# Patient Record
Sex: FEMALE | Race: WHITE | NOT HISPANIC OR LATINO | URBAN - METROPOLITAN AREA
[De-identification: names, ages, dates, MRNs, and addresses within clinical notes are randomized per-mention and may not be internally consistent; named-entity substitution may affect disease eponyms.]

---

## 2017-01-25 ENCOUNTER — FOLLOW UP ESTABLISHED (OUTPATIENT)
Dept: URBAN - METROPOLITAN AREA CLINIC 44 | Facility: CLINIC | Age: 67
End: 2017-01-25
Payer: MEDICARE

## 2017-01-25 DIAGNOSIS — H25.13 AGE-RELATED NUCLEAR CATARACT, BILATERAL: ICD-10-CM

## 2017-01-25 DIAGNOSIS — H35.3121 NONEXUDATIVE MACULAR DEGENERATION, EARLY DRY STAGE, LEFT EYE: ICD-10-CM

## 2017-01-25 DIAGNOSIS — H53.022 REFRACTIVE AMBLYOPIA, LEFT EYE: ICD-10-CM

## 2017-01-25 PROCEDURE — 92014 COMPRE OPH EXAM EST PT 1/>: CPT | Performed by: OPTOMETRIST

## 2017-01-25 ASSESSMENT — INTRAOCULAR PRESSURE
OD: 20
OS: 19

## 2017-01-25 ASSESSMENT — KERATOMETRY
OD: 44.88
OS: 45.00

## 2017-01-25 ASSESSMENT — VISUAL ACUITY
OS: 20/25
OD: 20/20

## 2017-11-21 ENCOUNTER — FOLLOW UP ESTABLISHED (OUTPATIENT)
Dept: URBAN - METROPOLITAN AREA CLINIC 44 | Facility: CLINIC | Age: 67
End: 2017-11-21
Payer: MEDICARE

## 2017-11-21 PROCEDURE — 92250 FUNDUS PHOTOGRAPHY W/I&R: CPT | Performed by: OPHTHALMOLOGY

## 2017-11-21 PROCEDURE — 92083 EXTENDED VISUAL FIELD XM: CPT | Performed by: OPHTHALMOLOGY

## 2017-11-21 PROCEDURE — 92020 GONIOSCOPY: CPT | Performed by: OPHTHALMOLOGY

## 2017-11-21 PROCEDURE — 92014 COMPRE OPH EXAM EST PT 1/>: CPT | Performed by: OPHTHALMOLOGY

## 2017-11-21 ASSESSMENT — INTRAOCULAR PRESSURE
OD: 17
OS: 18

## 2018-11-26 ENCOUNTER — FOLLOW UP ESTABLISHED (OUTPATIENT)
Dept: URBAN - METROPOLITAN AREA CLINIC 44 | Facility: CLINIC | Age: 68
End: 2018-11-26
Payer: MEDICARE

## 2018-11-26 PROCEDURE — 92012 INTRM OPH EXAM EST PATIENT: CPT | Performed by: OPHTHALMOLOGY

## 2018-11-26 PROCEDURE — 92083 EXTENDED VISUAL FIELD XM: CPT | Performed by: OPHTHALMOLOGY

## 2018-11-26 PROCEDURE — 92250 FUNDUS PHOTOGRAPHY W/I&R: CPT | Performed by: OPHTHALMOLOGY

## 2018-11-26 ASSESSMENT — INTRAOCULAR PRESSURE
OD: 26
OS: 26

## 2021-04-29 ENCOUNTER — OFFICE VISIT (OUTPATIENT)
Dept: URBAN - METROPOLITAN AREA CLINIC 44 | Facility: CLINIC | Age: 71
End: 2021-04-29
Payer: MEDICARE

## 2021-04-29 DIAGNOSIS — H40.013 OPEN ANGLE WITH BORDERLINE FINDINGS, LOW RISK, BILATERAL: Primary | ICD-10-CM

## 2021-04-29 PROCEDURE — 92133 CPTRZD OPH DX IMG PST SGM ON: CPT | Performed by: OPTOMETRIST

## 2021-04-29 PROCEDURE — 99204 OFFICE O/P NEW MOD 45 MIN: CPT | Performed by: OPTOMETRIST

## 2021-04-29 PROCEDURE — 92134 CPTRZ OPH DX IMG PST SGM RTA: CPT | Performed by: OPTOMETRIST

## 2021-04-29 RX ORDER — LATANOPROST 50 UG/ML
0.005 % SOLUTION OPHTHALMIC
Qty: 7.5 | Refills: 3 | Status: ACTIVE
Start: 2021-04-29

## 2021-04-29 ASSESSMENT — INTRAOCULAR PRESSURE
OD: 22
OS: 26

## 2021-04-29 ASSESSMENT — KERATOMETRY
OD: 44.88
OS: 44.88

## 2021-04-29 ASSESSMENT — VISUAL ACUITY
OS: 20/100
OD: 20/20

## 2021-04-29 NOTE — IMPRESSION/PLAN
Impression: Refractive amblyopia, left eye Plan: Anisometropia OS myopic. Discussed increased risk and si/sx of RD. Monitor.

## 2021-04-29 NOTE — IMPRESSION/PLAN
Impression: Open angle with borderline findings, low risk, bilateral: H40.013. Plan: Small CDR OU Pachymetry: 650/650 IOP: 22/26 Current meds: latanoprost QHS OU Hx of COPD -- avoid beta blockers OCT RNFL (04/29/21): OD 76 (wnl overall, sup/inf thinning) OS 81 (wnl overall, inf thinning) IOP suboptimal.  Discussed adding drops vs rechecking in 3 months. Patient prefers to recheck in 3 months. RTC 3 months for IOP + 24-2 HVF. If IOP elevated or VF worse, may need to add medications.

## 2021-04-29 NOTE — IMPRESSION/PLAN
Impression: Nonexudative macular degeneration, early dry stage, left eye - current smoker, smoking cessation discussion Plan: Mild-moderate RPE changes OS, no SRF OU Current smoker: 1/2 ppd x 50 years Continue AREDS 2 Advise smoking cessation RTC if notice changes in vision

## 2021-07-27 ENCOUNTER — OFFICE VISIT (OUTPATIENT)
Dept: URBAN - METROPOLITAN AREA CLINIC 44 | Facility: CLINIC | Age: 71
End: 2021-07-27
Payer: MEDICARE

## 2021-07-27 PROCEDURE — 92083 EXTENDED VISUAL FIELD XM: CPT | Performed by: OPTOMETRIST

## 2021-07-27 PROCEDURE — 99214 OFFICE O/P EST MOD 30 MIN: CPT | Performed by: OPTOMETRIST

## 2021-07-27 RX ORDER — BRIMONIDINE TARTRATE 2 MG/ML
0.2 % SOLUTION/ DROPS OPHTHALMIC
Qty: 10 | Refills: 5 | Status: ACTIVE
Start: 2021-07-27

## 2021-07-27 ASSESSMENT — INTRAOCULAR PRESSURE
OD: 27
OS: 25
OD: 32
OS: 31

## 2021-07-27 NOTE — IMPRESSION/PLAN
Impression: Primary open-angle glaucoma, moderate stage, bilateral: H40.1132. Plan: Small CDR OU Pachymetry: 650/650 IOP: 27/25 Current meds: latanoprost QHS OU Hx of COPD -- avoid beta blockers OCT RNFL (04/29/21): OD 76 (wnl overall, sup/inf thinning) OS 81 (wnl overall, inf thinning) HVF (07/27/21): OD moderate paracentral defects (reliable) OS severe paracentral defects (reliable) IOP suboptimal.  Patient mentioned difficulty during the VF, so VF may look worse than it actually is. Continue latanoprost QHS OU. Start brimonidine BID OU. RTC 2-3 months for IOP check.

## 2021-09-29 ENCOUNTER — OFFICE VISIT (OUTPATIENT)
Dept: URBAN - METROPOLITAN AREA CLINIC 44 | Facility: CLINIC | Age: 71
End: 2021-09-29
Payer: MEDICARE

## 2021-09-29 PROCEDURE — 99213 OFFICE O/P EST LOW 20 MIN: CPT | Performed by: OPTOMETRIST

## 2021-09-29 ASSESSMENT — INTRAOCULAR PRESSURE
OD: 18
OS: 18

## 2021-09-29 NOTE — IMPRESSION/PLAN
Impression: Primary open-angle glaucoma, moderate stage, bilateral: H40.1132. Plan: Small CDR OU Pachymetry: 650/650 IOP: 18/18 Current meds: latanoprost QHS OU, brimonidine BID OU (started 07/27/21) Hx of COPD -- avoid beta blockers OCT RNFL (04/29/21): OD 76 (wnl overall, sup/inf thinning) OS 81 (wnl overall, inf thinning) HVF (07/27/21): OD moderate paracentral defects (reliable) OS severe paracentral defects (reliable) IOP improved after starting brimonidine. Continue latanoprost QHS OU, brimonidine BID OU.   RTC 6 months for complete exam + possible OCT RNFL

## 2021-11-01 ENCOUNTER — OFFICE VISIT (OUTPATIENT)
Dept: URBAN - METROPOLITAN AREA CLINIC 44 | Facility: CLINIC | Age: 71
End: 2021-11-01
Payer: MEDICARE

## 2021-11-01 PROCEDURE — 99214 OFFICE O/P EST MOD 30 MIN: CPT | Performed by: OPTOMETRIST

## 2021-11-01 RX ORDER — DORZOLAMIDE HCL 20 MG/ML
2 % SOLUTION/ DROPS OPHTHALMIC
Qty: 5 | Refills: 5 | Status: INACTIVE
Start: 2021-11-01 | End: 2022-01-05

## 2021-11-01 ASSESSMENT — INTRAOCULAR PRESSURE
OD: 22
OS: 22

## 2021-11-01 NOTE — IMPRESSION/PLAN
Impression: Primary open-angle glaucoma, moderate stage, bilateral: H40.1132. Plan: Small CDR OU Pachymetry: 650/650 IOP: 22/22 Current meds: latanoprost QHS OU Drop hx: irritation/blurry vision to brimonidine Hx of COPD -- avoid beta blockers OCT RNFL (04/29/21): OD 76 (wnl overall, sup/inf thinning) OS 81 (wnl overall, inf thinning) HVF (07/27/21): OD moderate paracentral defects (reliable) OS severe paracentral defects (reliable) Vision became sightly blurry after starting brimonidine. She discontinued almost a week ago. Use AT's q2h for the next week and then start dorzolamide BID OU (ERx sent). Continue latanoprost QHS OU. RTC 2 months for IOP check.

## 2022-01-05 ENCOUNTER — OFFICE VISIT (OUTPATIENT)
Dept: URBAN - METROPOLITAN AREA CLINIC 44 | Facility: CLINIC | Age: 72
End: 2022-01-05
Payer: MEDICARE

## 2022-01-05 DIAGNOSIS — H40.1132 PRIMARY OPEN-ANGLE GLAUCOMA, MODERATE STAGE, BILATERAL: Primary | ICD-10-CM

## 2022-01-05 PROCEDURE — 99213 OFFICE O/P EST LOW 20 MIN: CPT | Performed by: OPTOMETRIST

## 2022-01-05 RX ORDER — DORZOLAMIDE HCL 20 MG/ML
2 % SOLUTION/ DROPS OPHTHALMIC
Qty: 15 | Refills: 3 | Status: ACTIVE
Start: 2022-01-05

## 2022-01-05 ASSESSMENT — INTRAOCULAR PRESSURE
OD: 17
OS: 16

## 2022-01-05 NOTE — IMPRESSION/PLAN
Impression: Primary open-angle glaucoma, moderate stage, bilateral: H40.1132. Plan: Small CDR OU Pachymetry: 650/650 IOP: 17/16 Current meds: latanoprost QHS OU, dorzolamide BID OU Drop hx: irritation/blurry vision to brimonidine Hx of COPD -- avoid beta blockers OCT RNFL (04/29/21): OD 76 (wnl overall, sup/inf thinning) OS 81 (wnl overall, inf thinning) HVF (07/27/21): OD moderate paracentral defects (reliable) OS severe paracentral defects (reliable) IOP improved and patient tolerating meds well. Continue current meds.   RTC 4 months for complete exam + 24-2 HVF

## 2022-05-11 ENCOUNTER — OFFICE VISIT (OUTPATIENT)
Dept: URBAN - METROPOLITAN AREA CLINIC 44 | Facility: CLINIC | Age: 72
End: 2022-05-11
Payer: MEDICARE

## 2022-05-11 DIAGNOSIS — H25.813 COMBINED FORMS OF AGE-RELATED CATARACT, BILATERAL: ICD-10-CM

## 2022-05-11 DIAGNOSIS — H43.813 VITREOUS DEGENERATION, BILATERAL: ICD-10-CM

## 2022-05-11 DIAGNOSIS — H53.022 REFRACTIVE AMBLYOPIA, LEFT EYE: ICD-10-CM

## 2022-05-11 DIAGNOSIS — H04.123 TEAR FILM INSUFFICIENCY OF BILATERAL LACRIMAL GLANDS: Primary | ICD-10-CM

## 2022-05-11 DIAGNOSIS — H40.1132 PRIMARY OPEN-ANGLE GLAUCOMA, BILATERAL, MODERATE STAGE: ICD-10-CM

## 2022-05-11 DIAGNOSIS — H35.3121 NONEXUDATIVE AGE-RELATED MACULAR DEGENERATION, LEFT EYE, EARLY DRY STAGE: ICD-10-CM

## 2022-05-11 PROCEDURE — 92083 EXTENDED VISUAL FIELD XM: CPT | Performed by: OPTOMETRIST

## 2022-05-11 PROCEDURE — 99214 OFFICE O/P EST MOD 30 MIN: CPT | Performed by: OPTOMETRIST

## 2022-05-11 PROCEDURE — 92134 CPTRZ OPH DX IMG PST SGM RTA: CPT | Performed by: OPTOMETRIST

## 2022-05-11 ASSESSMENT — KERATOMETRY
OS: 45.00
OD: 45.00

## 2022-05-11 ASSESSMENT — VISUAL ACUITY
OS: 20/150
OD: 20/40

## 2022-05-11 ASSESSMENT — INTRAOCULAR PRESSURE
OD: 18
OS: 18

## 2022-05-11 NOTE — IMPRESSION/PLAN
Impression: Refractive amblyopia, left eye Plan: Anisometropia OS myopic. Discussed increased risk and si/sx of RD. Will limit VA after cataract surgery

## 2022-05-11 NOTE — IMPRESSION/PLAN
Impression: Nonexudative macular degeneration, early dry stage, left eye - current smoker, smoking cessation discussion Plan: Mild-moderate RPE changes OS, no SRF OU-- stable Current smoker: 1/2 ppd x 50 years Continue AREDS 2 Advise smoking cessation RTC if notice changes in vision

## 2022-05-11 NOTE — IMPRESSION/PLAN
Impression: Combined forms of age-related cataract, bilateral: H25.813. Plan: Discussed cataracts with patient. Discussed treatment options. Surgical treatment is recommended. Surgical risks and benefits discussed. Patient elects surgical treatment. Recommend surgery OU, OS first. standard lens, LenSx, ORA. Aim OD: plano. Aim OS: plano. Discussed near aim, but patient always wears glasses when reading. She does not mind wearing glasses after surgery. Consider MIGS. Glaucoma surgeon recommended. **Schedule cataract surgery when cleared by [facility]**.  Outcome of surgery limitations include:  Tear film insufficiency of bilateral lacrimal glands, Nonexudative age-related macular degeneration, left eye, early dry stage, Refractive amblyopia, left eye, Primary open-angle glaucoma, bilateral, moderate stage, and Vitreous degeneration, bilateral.

## 2022-05-11 NOTE — IMPRESSION/PLAN
Impression: Primary open-angle glaucoma, moderate stage, bilateral: H40.1132. Plan: Small CDR OU Pachymetry: 650/650 IOP: 18/18 Current meds: latanoprost QHS OU, dorzolamide BID OU Drop hx: irritation/blurry vision to brimonidine Hx of COPD -- avoid beta blockers OCT RNFL (04/29/21): OD 76 (wnl overall, sup/inf thinning) OS 81 (wnl overall, inf thinning) HVF (05/11/22): OD inf arcuate defect, shallow superior defect  (fair) OS dense paracentral defect  (poor) IOP okay. HVF stable OD, unreliable OS. Continue current meds. Discussed MIGS. Follow after cataract surgery.

## 2022-06-03 ENCOUNTER — OFFICE VISIT (OUTPATIENT)
Dept: URBAN - METROPOLITAN AREA CLINIC 44 | Facility: CLINIC | Age: 72
End: 2022-06-03
Payer: MEDICARE

## 2022-06-03 DIAGNOSIS — H25.813 COMBINED FORMS OF AGE-RELATED CATARACT, BILATERAL: ICD-10-CM

## 2022-06-03 DIAGNOSIS — H35.3122 NONEXUDATIVE MACULAR DEGENERATION, INTERMEDIATE DRY STAGE, LEFT EYE: Primary | ICD-10-CM

## 2022-06-03 DIAGNOSIS — H44.22 DEGENERATIVE MYOPIA, LEFT EYE: ICD-10-CM

## 2022-06-03 PROCEDURE — 92004 COMPRE OPH EXAM NEW PT 1/>: CPT | Performed by: OPHTHALMOLOGY

## 2022-06-03 PROCEDURE — 92134 CPTRZ OPH DX IMG PST SGM RTA: CPT | Performed by: OPHTHALMOLOGY

## 2022-06-03 ASSESSMENT — INTRAOCULAR PRESSURE
OD: 17
OS: 18

## 2022-06-03 NOTE — IMPRESSION/PLAN
Impression: Degenerative myopia, left eye: H44.22.  Plan: RD and secondary CNVM precautions emphasized

## 2022-06-03 NOTE — IMPRESSION/PLAN
Impression: Nonexudative macular degeneration, intermediate dry stage, left eye: H35.1215. Plan: No evidence of CNVM on imaging or examination. Will observe for now. Return precautions emphasized. If any distortions or changes in vision, to call immediately. [place]ler grid given Smoking cessation (not a smoker), AREDS2 vitamins, sunglasses on bright days.  Follow-up in 6 months, sooner PRN

## 2022-06-03 NOTE — IMPRESSION/PLAN
Impression: Combined forms of age-related cataract, bilateral: H25.813.  Plan: May proceed with CE/IOL OU - RD precautions emphasized OS

## 2022-07-11 ENCOUNTER — OFFICE VISIT (OUTPATIENT)
Dept: URBAN - METROPOLITAN AREA CLINIC 44 | Facility: CLINIC | Age: 72
End: 2022-07-11
Payer: MEDICARE

## 2022-07-11 DIAGNOSIS — Z01.818 ENCOUNTER FOR OTHER PREPROCEDURAL EXAMINATION: Primary | ICD-10-CM

## 2022-07-11 DIAGNOSIS — H25.13 AGE-RELATED NUCLEAR CATARACT, BILATERAL: Primary | ICD-10-CM

## 2022-07-11 PROCEDURE — 92025 CPTRIZED CORNEAL TOPOGRAPHY: CPT | Performed by: OPHTHALMOLOGY

## 2022-07-11 PROCEDURE — 99203 OFFICE O/P NEW LOW 30 MIN: CPT | Performed by: REGISTERED NURSE

## 2022-07-11 ASSESSMENT — PACHYMETRY
OS: 29.06
OS: 3.66
OD: 3.39
OD: 24.95

## 2022-07-23 ENCOUNTER — POST-OPERATIVE VISIT (OUTPATIENT)
Dept: URBAN - METROPOLITAN AREA CLINIC 44 | Facility: CLINIC | Age: 72
End: 2022-07-23
Payer: MEDICARE

## 2022-07-23 DIAGNOSIS — Z48.810 ENCOUNTER FOR SURGICAL AFTERCARE FOLLOWING SURGERY ON A SENSE ORGAN: Primary | ICD-10-CM

## 2022-07-23 PROCEDURE — 99024 POSTOP FOLLOW-UP VISIT: CPT | Performed by: OPTOMETRIST

## 2022-07-23 ASSESSMENT — INTRAOCULAR PRESSURE: OS: 17

## 2022-07-23 NOTE — IMPRESSION/PLAN
Impression: S/P Cataract Extraction by phacoemulsification with IOL placement; Astigmatic Keratotomy; Goniotomy OS - 1 Day. Encounter for surgical aftercare following surgery on a sense organ  Z48.810. Plan: Healing well. Use drops as prescribed.

## 2022-07-28 ENCOUNTER — POST-OPERATIVE VISIT (OUTPATIENT)
Dept: URBAN - METROPOLITAN AREA CLINIC 44 | Facility: CLINIC | Age: 72
End: 2022-07-28
Payer: MEDICARE

## 2022-07-28 DIAGNOSIS — Z48.810 ENCOUNTER FOR SURGICAL AFTERCARE FOLLOWING SURGERY ON A SENSE ORGAN: Primary | ICD-10-CM

## 2022-07-28 PROCEDURE — 99024 POSTOP FOLLOW-UP VISIT: CPT | Performed by: OPTOMETRIST

## 2022-07-28 ASSESSMENT — VISUAL ACUITY
OS: 20/25
OD: 20/25

## 2022-07-28 ASSESSMENT — INTRAOCULAR PRESSURE
OD: 15
OS: 14

## 2022-07-28 NOTE — IMPRESSION/PLAN
Impression: S/P Cataract Extraction by phacoemulsification with IOL placement; Astigmatic Keratotomy; Goniotomy OS - 6 Days. Encounter for surgical aftercare following surgery on a sense organ  Z48.810. Plan: Healing well with good vision. Proceed with 2nd Surgery.

## 2022-08-05 ENCOUNTER — POST-OPERATIVE VISIT (OUTPATIENT)
Dept: URBAN - METROPOLITAN AREA CLINIC 44 | Facility: CLINIC | Age: 72
End: 2022-08-05
Payer: MEDICARE

## 2022-08-05 DIAGNOSIS — Z96.1 PRESENCE OF INTRAOCULAR LENS: Primary | ICD-10-CM

## 2022-08-05 PROCEDURE — 99024 POSTOP FOLLOW-UP VISIT: CPT | Performed by: OPTOMETRIST

## 2022-08-05 ASSESSMENT — INTRAOCULAR PRESSURE: OD: 19

## 2022-08-05 NOTE — IMPRESSION/PLAN
Impression: S/P Cataract Extraction by phacoemulsification with IOL placement; LRI (Limbal Relaxing Incision); ORA; Goniotomy OD - 1 Day. Presence of intraocular lens  Z96.1. Plan: Small nasal defect vs dellen? Start ofloxacin TID. Unable to place BCL due to chemosis. Start AT's q1h. RTC 1 week for follow up.

## 2022-08-09 ENCOUNTER — POST-OPERATIVE VISIT (OUTPATIENT)
Dept: URBAN - METROPOLITAN AREA CLINIC 44 | Facility: CLINIC | Age: 72
End: 2022-08-09
Payer: MEDICARE

## 2022-08-09 DIAGNOSIS — Z96.1 PRESENCE OF INTRAOCULAR LENS: Primary | ICD-10-CM

## 2022-08-09 PROCEDURE — 99024 POSTOP FOLLOW-UP VISIT: CPT | Performed by: OPTOMETRIST

## 2022-08-09 ASSESSMENT — INTRAOCULAR PRESSURE
OD: 21
OS: 39
OS: 43
OD: 27

## 2022-08-09 ASSESSMENT — VISUAL ACUITY
OS: 20/25
OD: 20/40

## 2022-08-09 NOTE — IMPRESSION/PLAN
Impression: S/P Cataract Extraction by phacoemulsification with IOL placement; LRI (Limbal Relaxing Incision); ORA; Goniotomy OD - 5 Days. Presence of intraocular lens  Z96.1. Plan: Epithelial defect resolved. IOP elevated OS>OD. Continue latanoprost QHS OU. Increase dorzolamide TID OU. Add rhopressa QHS OU
RTC 1 week for IOP check.

## 2022-08-19 ENCOUNTER — POST-OPERATIVE VISIT (OUTPATIENT)
Dept: URBAN - METROPOLITAN AREA CLINIC 44 | Facility: CLINIC | Age: 72
End: 2022-08-19
Payer: MEDICARE

## 2022-08-19 DIAGNOSIS — Z96.1 PRESENCE OF INTRAOCULAR LENS: Primary | ICD-10-CM

## 2022-08-19 PROCEDURE — 99024 POSTOP FOLLOW-UP VISIT: CPT | Performed by: OPTOMETRIST

## 2022-08-19 RX ORDER — NETARSUDIL 0.2 MG/ML
0.02 % SOLUTION/ DROPS OPHTHALMIC; TOPICAL
Qty: 2.55 | Refills: 0 | Status: ACTIVE
Start: 2022-08-19

## 2022-08-19 ASSESSMENT — VISUAL ACUITY
OS: 20/25
OD: 20/25

## 2022-08-19 ASSESSMENT — INTRAOCULAR PRESSURE
OS: 26
OD: 19
OD: 23
OS: 19

## 2022-08-19 NOTE — IMPRESSION/PLAN
Impression: S/P Cataract Extraction by phacoemulsification with IOL placement; LRI (Limbal Relaxing Incision); ORA; Goniotomy OD - 15 Days. Presence of intraocular lens  Z96.1. Plan: IOP elevated OS>OD but better. Continue latanoprost QHS OU, dorzolamide TID OU, rhopressa QHS OU. Will follow IOP in 2 weeks. If still elevated, may need to refer to Glaucoma.

## 2022-09-01 ENCOUNTER — POST-OPERATIVE VISIT (OUTPATIENT)
Dept: URBAN - METROPOLITAN AREA CLINIC 44 | Facility: CLINIC | Age: 72
End: 2022-09-01
Payer: MEDICARE

## 2022-09-01 DIAGNOSIS — Z96.1 PRESENCE OF INTRAOCULAR LENS: Primary | ICD-10-CM

## 2022-09-01 PROCEDURE — 99024 POSTOP FOLLOW-UP VISIT: CPT | Performed by: OPTOMETRIST

## 2022-09-01 ASSESSMENT — INTRAOCULAR PRESSURE
OD: 23
OD: 20
OS: 25
OS: 20

## 2022-09-01 ASSESSMENT — VISUAL ACUITY
OS: 20/25
OD: 20/20

## 2022-09-01 NOTE — IMPRESSION/PLAN
Impression:  Presence of intraocular lens  Z96.1. Plan: IOP still elevated. No effect with addition of Rhopressa. D/c Rhopressa. Continue latanoprost QHS OU and dorzolamide TID OU. Refer to Glaucoma.

## 2022-10-10 ENCOUNTER — OFFICE VISIT (OUTPATIENT)
Dept: URBAN - METROPOLITAN AREA CLINIC 44 | Facility: CLINIC | Age: 72
End: 2022-10-10
Payer: MEDICARE

## 2022-10-10 DIAGNOSIS — H04.123 TEAR FILM INSUFFICIENCY OF BILATERAL LACRIMAL GLANDS: ICD-10-CM

## 2022-10-10 DIAGNOSIS — H40.1132 PRIMARY OPEN-ANGLE GLAUCOMA, MODERATE STAGE, BILATERAL: Primary | ICD-10-CM

## 2022-10-10 DIAGNOSIS — H35.3121 NONEXUDATIVE MACULAR DEGENERATION, EARLY DRY STAGE, LEFT EYE: ICD-10-CM

## 2022-10-10 DIAGNOSIS — H52.13 MYOPIA, BILATERAL: ICD-10-CM

## 2022-10-10 PROCEDURE — 92020 GONIOSCOPY: CPT | Performed by: OPHTHALMOLOGY

## 2022-10-10 PROCEDURE — 92083 EXTENDED VISUAL FIELD XM: CPT | Performed by: OPHTHALMOLOGY

## 2022-10-10 PROCEDURE — 99214 OFFICE O/P EST MOD 30 MIN: CPT | Performed by: OPHTHALMOLOGY

## 2022-10-10 ASSESSMENT — INTRAOCULAR PRESSURE
OS: 20
OD: 21

## 2022-10-10 NOTE — IMPRESSION/PLAN
Impression: Primary open-angle glaucoma, moderate stage, bilateral: H40.1132. CEIOL with Baltazar Cheema ([place]) +PDS OU Plan: Pt has Glaucoma ( pigment dispersion syndrome)   Gonio : open to SS/ 3+TMP/ goniotomy ( clock hour)      Pachs:  650/650    Today's IOP :21/20       Tmax  :  32/43 Target IOP 21 OU Fhx of Glaucoma- Mother Right / Left eye is the better seeing eye HVF Full OD // enlarged BS OS (2/2 high myopia) C/D:  0.3/0.3  - high myopic change with significant PPA OU 
OCT: 76/87 Pt denies Sulfa Allergy //Heart dx 
(+) Hx of Lung - COPD Plan :
1. Continue  latanoprost QHS OU 
    STOP: Dorzolamide OU 2/2 burning and I would like to see how much IOP lowering occurred 2/2 CEIOL with MIGS Discussed details about Glaucoma and that without proper control of pressures irreversible blindness can occur. Patient understands risks. Emphasize compliance with drop and without compliance vision loss progression can occur. 2. Small C/D OU with myopic change - needs IOP RTC: 1 month IOP Dr Martinez Gather for IOP check without dorzolamide 3.  If IOP is not controlled pt is a good SLT candidate, can also have additional goniotomy (plenty of room)

## 2022-11-08 ENCOUNTER — OFFICE VISIT (OUTPATIENT)
Dept: URBAN - METROPOLITAN AREA CLINIC 44 | Facility: CLINIC | Age: 72
End: 2022-11-08
Payer: MEDICARE

## 2022-11-08 DIAGNOSIS — H40.1132 PRIMARY OPEN-ANGLE GLAUCOMA, MODERATE STAGE, BILATERAL: Primary | ICD-10-CM

## 2022-11-08 PROCEDURE — 99214 OFFICE O/P EST MOD 30 MIN: CPT | Performed by: OPTOMETRIST

## 2022-11-08 ASSESSMENT — INTRAOCULAR PRESSURE
OD: 21
OS: 25
OD: 26
OS: 23

## 2022-11-08 NOTE — IMPRESSION/PLAN
Impression: Primary open-angle glaucoma, moderate stage, bilateral: H40.1132. Plan: IOP borderline OD, suboptimal OS on latanoprost QHS OU. Hx of CEIOL w/KDB OU Per Dr. Perla Honeycutt notes, if IOP suboptimal on latanoprost alone, can schedule SLT OU, OS first (does not need to see Dr. Ousmane Rushing in clinic prior).   Schedule 6 week IOP check after w/Tsang

## 2023-01-30 ENCOUNTER — SURGERY (OUTPATIENT)
Dept: URBAN - METROPOLITAN AREA SURGERY 19 | Facility: SURGERY | Age: 73
End: 2023-01-30
Payer: MEDICARE

## 2023-01-30 RX ORDER — PREDNISOLONE ACETATE 10 MG/ML
1 % SUSPENSION/ DROPS OPHTHALMIC
Qty: 5 | Refills: 1 | Status: ACTIVE
Start: 2023-01-30

## 2023-06-13 ENCOUNTER — OFFICE VISIT (OUTPATIENT)
Dept: URBAN - METROPOLITAN AREA CLINIC 44 | Facility: CLINIC | Age: 73
End: 2023-06-13
Payer: MEDICARE

## 2023-06-13 DIAGNOSIS — H35.3121 NONEXUDATIVE AGE-RELATED MACULAR DEGENERATION, LEFT EYE, EARLY DRY STAGE: ICD-10-CM

## 2023-06-13 DIAGNOSIS — H40.1132 PRIMARY OPEN-ANGLE GLAUCOMA, BILATERAL, MODERATE STAGE: ICD-10-CM

## 2023-06-13 DIAGNOSIS — H26.493 OTHER SECONDARY CATARACT, BILATERAL: ICD-10-CM

## 2023-06-13 DIAGNOSIS — H44.22 DEGENERATIVE MYOPIA, LEFT EYE: ICD-10-CM

## 2023-06-13 DIAGNOSIS — H52.4 PRESBYOPIA: ICD-10-CM

## 2023-06-13 DIAGNOSIS — H43.22 CRYSTALLINE DEPOSITS IN VITREOUS BODY, LEFT EYE: ICD-10-CM

## 2023-06-13 DIAGNOSIS — H43.813 VITREOUS DEGENERATION, BILATERAL: ICD-10-CM

## 2023-06-13 DIAGNOSIS — H04.123 TEAR FILM INSUFFICIENCY OF BILATERAL LACRIMAL GLANDS: Primary | ICD-10-CM

## 2023-06-13 PROCEDURE — 92133 CPTRZD OPH DX IMG PST SGM ON: CPT | Performed by: OPTOMETRIST

## 2023-06-13 PROCEDURE — 92134 CPTRZ OPH DX IMG PST SGM RTA: CPT | Performed by: OPTOMETRIST

## 2023-06-13 PROCEDURE — 99214 OFFICE O/P EST MOD 30 MIN: CPT | Performed by: OPTOMETRIST

## 2023-06-13 ASSESSMENT — KERATOMETRY
OD: 45.00
OS: 45.13

## 2023-06-13 ASSESSMENT — INTRAOCULAR PRESSURE
OD: 19
OD: 20
OS: 19

## 2023-06-13 ASSESSMENT — VISUAL ACUITY
OS: 20/30
OD: 20/20

## 2023-06-13 NOTE — IMPRESSION/PLAN
Impression: Tear film insufficiency of bilateral lacrimal glands Plan: art tears still Impression: Vitreous degeneration, bilateral: H43.813. Plan: Patient educated on findings. Retina attached 360 RTC asap if notice symptoms of RD (reviewed with patient)

## 2023-06-13 NOTE — IMPRESSION/PLAN
Impression: Other secondary cataract, bilateral: H26.493. Plan: Opacified capsule with option for YAG laser Capsulotomy. Discussed procedure, risks, benefits and side effects. Patient remains happy with current level of vision and defers treatment at this time. Patient prefers to re-evaluate in 6 months.

## 2023-06-13 NOTE — IMPRESSION/PLAN
Impression: Nonexudative macular degeneration, early dry stage, left eye - current smoker, smoking cessation discussion Plan: Mild-moderate RPE changes OS, no SRF OU on mac OCT. Current smoker: 1/2 ppd x 50 years Continue AREDS 2 Advise smoking cessation RTC if notice changes in vision Follow-up with Dr. Ted Robles.

## 2023-06-13 NOTE — IMPRESSION/PLAN
Impression: Degenerative myopia, left eye: H44.22. Plan: Overdue to see Dr. Aguilar Seen. No SRF today. Schedule follow up with Dr. Aguilar Seen.

## 2023-06-13 NOTE — IMPRESSION/PLAN
Impression: Primary open-angle glaucoma, moderate stage, bilateral: H40.1132. Plan: Small CDR OU Pachymetry: 650/650 IOP: 19/19 SLT OU on 01/30/23 Current meds: latanoprost QHS OU Drop hx: irritation/blurry vision to brimonidine Hx of COPD -- avoid beta blockers OCT RNFL (06/13/23): OD 76 wnl OS 84 wnl HVF (05/11/22): OD inf arcuate defect, shallow superior defect  (fair) OS dense paracentral defect  (poor) IOP good OU. Continue current drops. RTC 6 months for an Complete Exam + 24-2 HVF. Principal Discharge DX:	Abdominal pain

## 2023-08-25 ENCOUNTER — OFFICE VISIT (OUTPATIENT)
Dept: URBAN - METROPOLITAN AREA CLINIC 44 | Facility: CLINIC | Age: 73
End: 2023-08-25
Payer: MEDICARE

## 2023-08-25 DIAGNOSIS — H44.22 DEGENERATIVE MYOPIA, LEFT EYE: ICD-10-CM

## 2023-08-25 DIAGNOSIS — H35.3122 NONEXUDATIVE MACULAR DEGENERATION, INTERMEDIATE DRY STAGE, LEFT EYE: Primary | ICD-10-CM

## 2023-08-25 PROCEDURE — 92014 COMPRE OPH EXAM EST PT 1/>: CPT | Performed by: OPHTHALMOLOGY

## 2023-08-25 PROCEDURE — 92134 CPTRZ OPH DX IMG PST SGM RTA: CPT | Performed by: OPHTHALMOLOGY

## 2023-08-25 ASSESSMENT — INTRAOCULAR PRESSURE
OD: 15
OS: 15

## 2023-12-13 ENCOUNTER — OFFICE VISIT (OUTPATIENT)
Dept: URBAN - METROPOLITAN AREA CLINIC 44 | Facility: CLINIC | Age: 73
End: 2023-12-13
Payer: MEDICARE

## 2023-12-13 DIAGNOSIS — H40.1132 PRIMARY OPEN-ANGLE GLAUCOMA, BILATERAL, MODERATE STAGE: ICD-10-CM

## 2023-12-13 DIAGNOSIS — H43.813 VITREOUS DEGENERATION, BILATERAL: ICD-10-CM

## 2023-12-13 DIAGNOSIS — H26.493 OTHER SECONDARY CATARACT, BILATERAL: ICD-10-CM

## 2023-12-13 DIAGNOSIS — H43.22 CRYSTALLINE DEPOSITS IN VITREOUS BODY, LEFT EYE: ICD-10-CM

## 2023-12-13 DIAGNOSIS — H04.123 TEAR FILM INSUFFICIENCY OF BILATERAL LACRIMAL GLANDS: Primary | ICD-10-CM

## 2023-12-13 DIAGNOSIS — H35.3122 NONEXUDATIVE AGE-RELATED MACULAR DEGENERATION, LEFT EYE, INTERMEDIATE DRY STAGE: ICD-10-CM

## 2023-12-13 PROCEDURE — 99214 OFFICE O/P EST MOD 30 MIN: CPT | Performed by: OPTOMETRIST

## 2023-12-13 PROCEDURE — 92083 EXTENDED VISUAL FIELD XM: CPT | Performed by: OPTOMETRIST

## 2023-12-13 PROCEDURE — 92134 CPTRZ OPH DX IMG PST SGM RTA: CPT | Performed by: OPTOMETRIST

## 2023-12-13 ASSESSMENT — INTRAOCULAR PRESSURE
OS: 26
OD: 18
OD: 21
OS: 19

## 2023-12-13 ASSESSMENT — VISUAL ACUITY
OS: 20/30
OD: 20/20

## 2023-12-13 ASSESSMENT — KERATOMETRY: OD: 45.13

## 2024-01-19 ENCOUNTER — SURGERY (OUTPATIENT)
Dept: URBAN - METROPOLITAN AREA SURGERY 19 | Facility: SURGERY | Age: 74
End: 2024-01-19
Payer: MEDICARE

## 2024-01-19 PROCEDURE — 66821 AFTER CATARACT LASER SURGERY: CPT | Performed by: OPHTHALMOLOGY

## 2024-01-26 ENCOUNTER — SURGERY (OUTPATIENT)
Dept: URBAN - METROPOLITAN AREA SURGERY 19 | Facility: SURGERY | Age: 74
End: 2024-01-26
Payer: MEDICARE

## 2024-01-26 PROCEDURE — 66821 AFTER CATARACT LASER SURGERY: CPT | Performed by: OPHTHALMOLOGY

## 2024-02-26 ENCOUNTER — OFFICE VISIT (OUTPATIENT)
Dept: URBAN - METROPOLITAN AREA CLINIC 44 | Facility: CLINIC | Age: 74
End: 2024-02-26
Payer: MEDICARE

## 2024-02-26 DIAGNOSIS — H35.3122 NONEXUDATIVE AGE-RELATED MACULAR DEGENERATION, LEFT EYE, INTERMEDIATE DRY STAGE: Primary | ICD-10-CM

## 2024-02-26 DIAGNOSIS — H44.22 DEGENERATIVE MYOPIA, LEFT EYE: ICD-10-CM

## 2024-02-26 PROCEDURE — 92134 CPTRZ OPH DX IMG PST SGM RTA: CPT | Performed by: OPHTHALMOLOGY

## 2024-02-26 PROCEDURE — 92014 COMPRE OPH EXAM EST PT 1/>: CPT | Performed by: OPHTHALMOLOGY

## 2024-02-26 ASSESSMENT — INTRAOCULAR PRESSURE
OS: 19
OD: 19

## 2024-06-26 ENCOUNTER — OFFICE VISIT (OUTPATIENT)
Dept: URBAN - METROPOLITAN AREA CLINIC 44 | Facility: CLINIC | Age: 74
End: 2024-06-26
Payer: MEDICARE

## 2024-06-26 DIAGNOSIS — H40.1132 PRIMARY OPEN-ANGLE GLAUCOMA, MODERATE STAGE, BILATERAL: Primary | ICD-10-CM

## 2024-06-26 PROCEDURE — 99213 OFFICE O/P EST LOW 20 MIN: CPT | Performed by: OPTOMETRIST

## 2024-06-26 ASSESSMENT — INTRAOCULAR PRESSURE
OS: 19
OD: 20

## 2024-08-23 ENCOUNTER — OFFICE VISIT (OUTPATIENT)
Dept: URBAN - METROPOLITAN AREA CLINIC 44 | Facility: CLINIC | Age: 74
End: 2024-08-23
Payer: MEDICARE

## 2024-08-23 DIAGNOSIS — H44.22 DEGENERATIVE MYOPIA, LEFT EYE: ICD-10-CM

## 2024-08-23 DIAGNOSIS — H35.3122 NONEXUDATIVE AGE-RELATED MACULAR DEGENERATION, LEFT EYE, INTERMEDIATE DRY STAGE: Primary | ICD-10-CM

## 2024-08-23 PROCEDURE — 99214 OFFICE O/P EST MOD 30 MIN: CPT | Performed by: OPHTHALMOLOGY

## 2024-08-23 PROCEDURE — 92134 CPTRZ OPH DX IMG PST SGM RTA: CPT | Performed by: OPHTHALMOLOGY

## 2024-08-23 ASSESSMENT — INTRAOCULAR PRESSURE
OS: 13
OD: 15

## 2025-01-16 ENCOUNTER — OFFICE VISIT (OUTPATIENT)
Dept: URBAN - METROPOLITAN AREA CLINIC 44 | Facility: CLINIC | Age: 75
End: 2025-01-16
Payer: MEDICARE

## 2025-01-16 DIAGNOSIS — H40.1132 PRIMARY OPEN-ANGLE GLAUCOMA, BILATERAL, MODERATE STAGE: ICD-10-CM

## 2025-01-16 DIAGNOSIS — Z96.1 PRESENCE OF INTRAOCULAR LENS: ICD-10-CM

## 2025-01-16 DIAGNOSIS — H43.813 VITREOUS DEGENERATION, BILATERAL: ICD-10-CM

## 2025-01-16 DIAGNOSIS — H35.3122 NONEXUDATIVE MACULAR DEGENERATION, INTERMEDIATE DRY STAGE, LEFT EYE: ICD-10-CM

## 2025-01-16 DIAGNOSIS — H44.22 DEGENERATIVE MYOPIA, LEFT EYE: ICD-10-CM

## 2025-01-16 DIAGNOSIS — H43.22 CRYSTALLINE DEPOSITS IN VITREOUS BODY, LEFT EYE: ICD-10-CM

## 2025-01-16 DIAGNOSIS — H04.123 TEAR FILM INSUFFICIENCY OF BILATERAL LACRIMAL GLANDS: Primary | ICD-10-CM

## 2025-01-16 PROCEDURE — 92083 EXTENDED VISUAL FIELD XM: CPT | Performed by: OPTOMETRIST

## 2025-01-16 PROCEDURE — 92134 CPTRZ OPH DX IMG PST SGM RTA: CPT | Performed by: OPTOMETRIST

## 2025-01-16 PROCEDURE — 99214 OFFICE O/P EST MOD 30 MIN: CPT | Performed by: OPTOMETRIST

## 2025-01-16 ASSESSMENT — KERATOMETRY
OS: 44.88
OD: 45.00

## 2025-01-16 ASSESSMENT — VISUAL ACUITY
OS: 20/20
OD: 20/20

## 2025-01-16 ASSESSMENT — INTRAOCULAR PRESSURE
OS: 20
OD: 21

## 2025-07-16 ENCOUNTER — OFFICE VISIT (OUTPATIENT)
Dept: URBAN - METROPOLITAN AREA CLINIC 44 | Facility: CLINIC | Age: 75
End: 2025-07-16
Payer: MEDICARE

## 2025-07-16 DIAGNOSIS — H40.1132 PRIMARY OPEN-ANGLE GLAUCOMA, MODERATE STAGE, BILATERAL: Primary | ICD-10-CM

## 2025-07-16 PROCEDURE — 92133 CPTRZD OPH DX IMG PST SGM ON: CPT | Performed by: OPTOMETRIST

## 2025-07-16 PROCEDURE — 99213 OFFICE O/P EST LOW 20 MIN: CPT | Performed by: OPTOMETRIST

## 2025-07-16 RX ORDER — LATANOPROST 50 UG/ML
0.005 % SOLUTION OPHTHALMIC
Qty: 7.5 | Refills: 3 | Status: ACTIVE
Start: 2025-07-16

## 2025-07-16 ASSESSMENT — INTRAOCULAR PRESSURE
OS: 19
OD: 20